# Patient Record
Sex: FEMALE | URBAN - METROPOLITAN AREA
[De-identification: names, ages, dates, MRNs, and addresses within clinical notes are randomized per-mention and may not be internally consistent; named-entity substitution may affect disease eponyms.]

---

## 2022-10-11 ENCOUNTER — OFFICE VISIT (OUTPATIENT)
Dept: URBAN - METROPOLITAN AREA CLINIC 3 | Facility: CLINIC | Age: 37
End: 2022-10-11
Payer: COMMERCIAL

## 2022-10-11 DIAGNOSIS — H40.033 ANATOMICAL NARROW ANGLE, BILATERAL: ICD-10-CM

## 2022-10-11 DIAGNOSIS — H43.811 VITREOUS DEGENERATION, RIGHT EYE: ICD-10-CM

## 2022-10-11 DIAGNOSIS — H20.011 PRIMARY IRIDOCYCLITIS, RIGHT EYE: ICD-10-CM

## 2022-10-11 DIAGNOSIS — H43.313 VITREOUS MEMBRANES AND STRANDS, BILATERAL: ICD-10-CM

## 2022-10-11 DIAGNOSIS — H33.011 RETINAL DETACHMENT WITH SINGLE BREAK, RIGHT EYE: Primary | ICD-10-CM

## 2022-10-11 PROCEDURE — 99204 OFFICE O/P NEW MOD 45 MIN: CPT | Performed by: OPTOMETRIST

## 2022-10-11 PROCEDURE — 92020 GONIOSCOPY: CPT | Performed by: OPTOMETRIST

## 2022-10-11 RX ORDER — PREDNISOLONE ACETATE 10 MG/ML
1 % SUSPENSION/ DROPS OPHTHALMIC
Qty: 5 | Refills: 0 | Status: INACTIVE
Start: 2022-10-11 | End: 2022-10-22

## 2022-10-11 ASSESSMENT — INTRAOCULAR PRESSURE
OD: 11
OS: 10

## 2022-10-11 NOTE — IMPRESSION/PLAN
Impression: Anatomical narrow angle, bilateral: H40.033. Plan: Narrow angle with gonioscopy Bailey grading 3 with 1+ pigment both eyes. Condition places patient at risk for angle closure and prevents dilated eye examination.

## 2022-10-11 NOTE — IMPRESSION/PLAN
Impression: Retinal detachment with single break, right eye: H33.011. Plan: Refer to retina specialist ASAP. The findings was discussed in detail with the patient, who stated an understanding.

## 2022-10-11 NOTE — IMPRESSION/PLAN
Impression: Vitreous degeneration, right eye: H43.811. Plan: New onset. Reviewed the signs and symptoms of possible retinal detachment (flashes, floaters, change in peripheral vision, etc). Advised to contact us immediately for any of these or other new symptoms.

## 2022-10-11 NOTE — IMPRESSION/PLAN
Impression: Primary iridocyclitis, right eye: H20.011. Plan: Traumatic iritis right eye. Start prednisolone acetate Q2h while awake for 2 days then 4 times a day for 4 days then TID for 3 days then BID for 2 days then QD for 1 day then STOP. (shake very well). Return immediately for increased pain, increased redness, or decreased vision.

## 2025-05-21 ENCOUNTER — OFFICE VISIT (OUTPATIENT)
Dept: URBAN - METROPOLITAN AREA CLINIC 3 | Facility: CLINIC | Age: 40
End: 2025-05-21
Payer: COMMERCIAL

## 2025-05-21 DIAGNOSIS — H43.811 VITREOUS DEGENERATION, RIGHT EYE: ICD-10-CM

## 2025-05-21 DIAGNOSIS — H43.313 VITREOUS MEMBRANES AND STRANDS, BILATERAL: ICD-10-CM

## 2025-05-21 DIAGNOSIS — H00.024 HORDEOLUM INTERNUM LEFT UPPER EYELID: Primary | ICD-10-CM

## 2025-05-21 PROCEDURE — 99214 OFFICE O/P EST MOD 30 MIN: CPT | Performed by: OPTOMETRIST

## 2025-05-21 ASSESSMENT — INTRAOCULAR PRESSURE
OD: 14
OS: 14

## 2025-05-28 ENCOUNTER — OFFICE VISIT (OUTPATIENT)
Dept: URBAN - METROPOLITAN AREA CLINIC 3 | Facility: CLINIC | Age: 40
End: 2025-05-28
Payer: COMMERCIAL

## 2025-05-28 DIAGNOSIS — H00.14 CHALAZION LEFT UPPER EYELID: Primary | ICD-10-CM

## 2025-05-28 PROCEDURE — 99212 OFFICE O/P EST SF 10 MIN: CPT | Performed by: OPTOMETRIST

## 2025-05-28 ASSESSMENT — INTRAOCULAR PRESSURE
OS: 10
OD: 14